# Patient Record
Sex: MALE | Race: WHITE | Employment: UNEMPLOYED | URBAN - METROPOLITAN AREA
[De-identification: names, ages, dates, MRNs, and addresses within clinical notes are randomized per-mention and may not be internally consistent; named-entity substitution may affect disease eponyms.]

---

## 2022-09-03 ENCOUNTER — HOSPITAL ENCOUNTER (EMERGENCY)
Age: 57
Discharge: HOME | End: 2022-09-03
Attending: EMERGENCY MEDICINE
Payer: OTHER GOVERNMENT

## 2022-09-03 VITALS
DIASTOLIC BLOOD PRESSURE: 81 MMHG | SYSTOLIC BLOOD PRESSURE: 131 MMHG | OXYGEN SATURATION: 94 % | RESPIRATION RATE: 15 BRPM | HEART RATE: 97 BPM | TEMPERATURE: 98.4 F

## 2022-09-03 DIAGNOSIS — T40.411A ACCIDENTAL FENTANYL OVERDOSE, INITIAL ENCOUNTER (HCC): Primary | ICD-10-CM

## 2022-09-03 LAB
GLUCOSE BLD-MCNC: 105 MG/DL (ref 75–110)
GLUCOSE BLD-MCNC: 95 MG/DL (ref 75–110)

## 2022-09-03 PROCEDURE — 6360000002 HC RX W HCPCS: Performed by: STUDENT IN AN ORGANIZED HEALTH CARE EDUCATION/TRAINING PROGRAM

## 2022-09-03 PROCEDURE — 96374 THER/PROPH/DIAG INJ IV PUSH: CPT

## 2022-09-03 PROCEDURE — 82947 ASSAY GLUCOSE BLOOD QUANT: CPT

## 2022-09-03 PROCEDURE — 2580000003 HC RX 258: Performed by: STUDENT IN AN ORGANIZED HEALTH CARE EDUCATION/TRAINING PROGRAM

## 2022-09-03 PROCEDURE — 93005 ELECTROCARDIOGRAM TRACING: CPT

## 2022-09-03 PROCEDURE — 99284 EMERGENCY DEPT VISIT MOD MDM: CPT

## 2022-09-03 RX ORDER — 0.9 % SODIUM CHLORIDE 0.9 %
1000 INTRAVENOUS SOLUTION INTRAVENOUS ONCE
Status: COMPLETED | OUTPATIENT
Start: 2022-09-03 | End: 2022-09-03

## 2022-09-03 RX ORDER — ONDANSETRON 2 MG/ML
4 INJECTION INTRAMUSCULAR; INTRAVENOUS ONCE
Status: COMPLETED | OUTPATIENT
Start: 2022-09-03 | End: 2022-09-03

## 2022-09-03 RX ADMIN — SODIUM CHLORIDE 1000 ML: 9 INJECTION, SOLUTION INTRAVENOUS at 04:38

## 2022-09-03 RX ADMIN — ONDANSETRON 4 MG: 2 INJECTION INTRAMUSCULAR; INTRAVENOUS at 04:37

## 2022-09-03 ASSESSMENT — ENCOUNTER SYMPTOMS
ABDOMINAL PAIN: 0
BACK PAIN: 0
VOMITING: 0
COUGH: 0
SHORTNESS OF BREATH: 0
SORE THROAT: 0

## 2022-09-03 NOTE — ED NOTES
Report taken from Lakisha Trejo, [facility]. Pt respirations are even and unlabored, bed is in the lowest position, call light is within reach. Will continue to monitor.        Milton Donato RN  09/03/22 1585

## 2022-09-03 NOTE — ED NOTES
Pt respirations are even and unlabored, pt is oriented X 4, speaking in complete sentences, bed is in the lowest position, call light is within reach. Will continue to monitor.        Clint Iraheta RN  09/03/22 7498

## 2022-09-03 NOTE — DISCHARGE INSTRUCTIONS
Discussed healthy drinking guidelines. Discussed patient's risk factors for alcohol misuse. Encouraged patient to set goal to moderate alcohol use. PLEASE RETURN TO THE EMERGENCY DEPARTMENT IMMEDIATELY for worsening symptoms, or if you develop any concerning symptoms such as: high fever not relieved by acetaminophen (Tylenol) and/or ibuprofen (Motrin), chills, shortness of breath, chest pain, persistent nausea and/or vomiting, vomiting any blood, blood in your stool, numbness, weakness or tingling in the arms or legs or change in color of the extremities, changes in mental status, persistent headache, blurry vision. Home

## 2022-09-03 NOTE — ED PROVIDER NOTES
[facility]  ED  Emergency Department Encounter  Emergency Medicine Resident     Pt Name:Jw Burns  MRN: [id number]  [place] 1965  Date of evaluation: 9/3/22  PCP:  No primary care provider on file. CHIEF COMPLAINT       Chief Complaint   Patient presents with    Drug Overdose       HISTORY OF PRESENT ILLNESS  (Location/Symptom, Timing/Onset, Context/Setting, Quality, Duration, Modifying Factors, Severity.)      Irlanda Jackson is a 61 y.o. male who presents with overdose on fentanyl. Patient admitted to snorting fentanyl earlier today, was found down, on  presents patient was unresponsive and required Narcan. Patient received a total of 8 mg of Narcan prior to arrival with regained consciousness. Patient is nauseous and shivering. Patient received 4 mg of Zofran prior to arrival.  Patient is alert and oriented at this time. Denies alcohol use, however per EMS there was questionable alcohol use on scene. Denies chest pain, shortness of breath, abdominal pain at this time. PAST MEDICAL / SURGICAL / SOCIAL / FAMILY HISTORY      has a past medical history of PTSD (post-traumatic stress disorder). has no past surgical history on file. Social History     Socioeconomic History    Marital status:      Spouse name: Not on file    Number of children: Not on file    Years of education: Not on file    Highest education level: Not on file   Occupational History    Not on file   Tobacco Use    Smoking status: Every Day     Packs/day: 0.50     Years: 30.00     Pack years: 15.00     Types: Cigarettes    Smokeless tobacco: Not on file   Substance and Sexual Activity    Alcohol use: Yes     Alcohol/week: 2.0 standard drinks     Types: 2 Cans of beer per week    Drug use: Yes     Types:  Other-see comments     Comment: Fentanyl    Sexual activity: Not on file   Other Topics Concern    Not on file   Social History Narrative    Not on file     Social Determinants of Health     Financial Resource Strain: Not on file   Food Insecurity: Not on file   Transportation Needs: Not on file   Physical Activity: Not on file   Stress: Not on file   Social Connections: Not on file   Intimate Partner Violence: Not on file   Housing Stability: Not on file       History reviewed. No pertinent family history. Allergies:  Patient has no known allergies. Home Medications:  Prior to Admission medications    Not on File       REVIEW OF SYSTEMS    (2-9 systems for level 4, 10 or more for level 5)      Review of Systems   Constitutional:  Negative for chills and fever. HENT:  Negative for sore throat. Eyes:  Negative for visual disturbance. Respiratory:  Negative for cough and shortness of breath. Cardiovascular:  Negative for chest pain and palpitations. Gastrointestinal:  Negative for abdominal pain and vomiting. Endocrine: Negative for polyuria. Genitourinary:  Negative for dysuria and hematuria. Musculoskeletal:  Negative for back pain. Skin:  Negative for rash. Neurological:  Negative for light-headedness and headaches. Psychiatric/Behavioral:  Negative for confusion. PHYSICAL EXAM   (up to 7 for level 4, 8 or more for level 5)      INITIAL VITALS:   /81   Pulse (!) 111   Temp 98.4 °F (36.9 °C)   Resp 15   SpO2 95%     Physical Exam  Constitutional:       Appearance: Normal appearance. Comments: Shivering   HENT:      Head: Normocephalic. Nose: Nose normal.      Mouth/Throat:      Mouth: Mucous membranes are moist.      Pharynx: Oropharynx is clear. Eyes:      Extraocular Movements: Extraocular movements intact. Pupils: Pupils are equal, round, and reactive to light. Cardiovascular:      Rate and Rhythm: Normal rate and regular rhythm. Pulses: Normal pulses. Heart sounds: Normal heart sounds. Pulmonary:      Effort: Pulmonary effort is normal.      Breath sounds: Normal breath sounds.    Abdominal:      Palpations: Abdomen is soft. Tenderness: There is no abdominal tenderness. There is no right CVA tenderness or left CVA tenderness. Musculoskeletal:      Right lower leg: No edema. Left lower leg: No edema. Skin:     General: Skin is warm. Capillary Refill: Capillary refill takes less than 2 seconds. Neurological:      General: No focal deficit present. Mental Status: He is alert and oriented to person, place, and time. DIFFERENTIAL  DIAGNOSIS     PLAN (LABS / IMAGING / EKG):  Orders Placed This Encounter   Procedures    POC Glucose Fingerstick    POC Glucose Fingerstick         MEDICATIONS ORDERED:  Orders Placed This Encounter   Medications    ondansetron (ZOFRAN) injection 4 mg    0.9 % sodium chloride bolus     DDX: Fentanyl overdose, other opioid overdose, EtOH intoxication, other drug overdose    DIAGNOSTIC RESULTS / EMERGENCY DEPARTMENT COURSE / MDM   LAB RESULTS:  Results for orders placed or performed during the hospital encounter of 09/03/22   POC Glucose Fingerstick   Result Value Ref Range    POC Glucose 95 75 - 110 mg/dL   POC Glucose Fingerstick   Result Value Ref Range    POC Glucose 105 75 - 110 mg/dL       IMPRESSION: 58-year-old gentleman presents to the emergency department after overdosing on fentanyl. Patient required 8 mg of Narcan total prior to arrival and regained consciousness. On arrival, patient is shivering and nauseous. Vital signs grossly stable, patient slightly tachycardic. Physical exam shows that patient is alert and oriented, is shivering and uncomfortable appearing. Abdomen is soft and nontender. Fluids, Zofran given. Patient is able to awaken now to voice, sleeping comfortably on stretcher. Notified by nursing staff that patient is sweaty, patient continues to be slightly tachycardic. Plan to let patient sleep and observe, reevaluation at a later time. Patient signed out to Dr. Todd Diehl for further care and evaluation.      RADIOLOGY:  No orders to display     EMERGENCY DEPARTMENT COURSE:  ED Course as of 09/03/22 0708   Sat Sep 03, 2022   0619 Patient resting comfortably on stretcher, responding to voice at this time. Patient is sweaty and tachycardic however comfortable. Plan to reevaluation after longer duration of observation [EM]      ED Course User Index  [EM] Saray Quach MD       No notes of EC Admission Criteria type on file. PROCEDURES:  None    CONSULTS:  None    FINAL IMPRESSION      1.  Accidental fentanyl overdose, initial encounter [facility])          DISPOSITION / PLAN     DISPOSITION        PATIENT REFERRED TO:  [address] [place] [id number] [phone]  Schedule an appointment as soon as possible for a visit   For follow up    [facility]  [address]  Go to   As needed    DISCHARGE MEDICATIONS:  New Prescriptions    No medications on file       Saray Quach MD  Emergency Medicine Resident    (Please note that portions of thisnote were completed with a voice recognition program.  Efforts were made to edit the dictations but occasionally words are mis-transcribed.)       Saray Quach MD  Resident  09/03/22 1805

## 2022-09-03 NOTE — ED NOTES
Pt profusely sweating. Pt denies being warm, in fact states he is cold. FSBS obtained which was 106 and temp 98.6. Dr Chika De La Rosa at bedside.       Kim Sosa, PARRIS  74/61/57 [id number]

## 2022-09-03 NOTE — ED PROVIDER NOTES
FACULTY SIGN-OUT  ADDENDUM     Care of this patient was assumed from previous attending physician. The patient's initial evaluation and plan have been discussed with the prior provider who initially evaluated the patient. Attestation  I was available and discussed any additional care issues that arose and coordinated the management plans with the resident(s) caring for the patient during my duty period. Any areas of disagreement with resident's documentation of care or procedures are noted on the chart. I was personally present for the key portions of any/all procedures, during my duty period. I have documented in the chart those procedures where I was not present during the key portions. ED COURSE      The patient was given the following medications:  Orders Placed This Encounter   Medications    ondansetron (ZOFRAN) injection 4 mg    0.9 % sodium chloride bolus       RECENT VITALS:   Temp: 98.4 °F (36.9 °C), Heart Rate: (!) 111, Resp: 15, BP: 128/81    MEDICAL DECISION MAKING        Daxa Vazquez is a 61 y.o. male who presents to the Emergency Department with complaints of fentanyl OD. Reassess when more awake.       Yajaira Buchanan MD  Attending Emergency Physician    (Please note that portions of this note were completed with a voice recognition program.  Efforts were made to edit the dictations but occasionally words are mis-transcribed.)          Yajaira Buchanan MD  09/03/22 6741

## 2022-09-03 NOTE — ED TRIAGE NOTES
Pt arrived via [facility] for Fentanyl overdose which pt stated he snorted . Pt was found by bystanders and agonal breathing on arrival. Pt was given 2mg of IN Narcan and 2mg IV Narcan. Pt ambulated to stretcher. Pt A&O x 3, does not appear in acute distress, resting comfortably on stretcher with eyes open and call light in reach. Vital signs obtained, medical hx and allergies reviewed with pt. Initial assessment performed by physician, [employer] will carry out initial orders/tasks and reassess pt.

## 2022-09-03 NOTE — ED NOTES
Pt resting with eyes closed. Pt showing no s/s of distress. Respirations even and unlabored.       Lincoln Walton LPN  79/43/93 7483

## 2022-09-03 NOTE — ED PROVIDER NOTES
[facility]   Emergency Department  Faculty Attestation       I performed a history and physical examination of the patient and discussed management with the resident. I reviewed the residents note and agree with the documented findings including all diagnostic interpretations and plan of care. Any areas of disagreement are noted on the chart. I was personally present for the key portions of any procedures. I have documented in the chart those procedures where I was not present during the key portions. I have reviewed the emergency nurses triage note. I agree with the chief complaint, past medical history, past surgical history, allergies, medications, social and family history as documented unless otherwise noted below. Documentation of the HPI, Physical Exam and Medical Decision Making performed by scribmerlyn is based on my personal performance of the HPI, PE and MDM. For Physician Assistant/ Nurse Practitioner cases/documentation I have personally evaluated this patient and have completed at least one if not all key elements of the E/M (history, physical exam, and MDM). Additional findings are as noted. Pertinent Comments     Primary Care Physician: No primary care provider on file. ED Triage Vitals   BP Temp Temp src Heart Rate Resp SpO2 Height Weight   09/03/22 0432 09/03/22 0429 -- 09/03/22 0429 09/03/22 0429 09/03/22 0429 -- --   (!) 173/97 98.4 °F (36.9 °C)  (!) 114 25 100 %            This is a 61 y.o. male who presents to the Emergency Department for overdose. Received Narcan. Does report that he did have fentanyl. No complaints on my exam, but was having shivers for initial resident evaluation. On exam sleeping comfortably awakens to voice. No significant distress. Slightly tachycardic in the low 110s. Heart sounds regular lungs auscultation. Abdomen soft nontender nondistended. Oriented x4 no focal deficits.     Monitor for redosing of narcan      EKG Interpretation    Interpreted by emergency department physician    Clinical Impression: Sinus tachycardia with incomplete right bundle branch block    Cassie Zhang MD        Critical Care: None     Cassie Zhang MD  Attending Emergency Physician         Cassie Zhang MD  09/03/22 6677

## 2022-09-03 NOTE — ED PROVIDER NOTES
As needed     DISCHARGE MEDICATIONS: There are no discharge medications for this patient.          Mayra Lemus MD  Emergency Medicine Resident  [address] [facility]        Mayra Lemus MD  Resident  09/03/22 7138

## 2022-09-03 NOTE — ED NOTES
Writer arranged patient transport home via [employer]. Cab Voucher used due to lack of transportation services with patient insurance.       LISA Castillo  09/03/22 6144

## 2022-09-06 LAB
EKG ATRIAL RATE: 115 BPM
EKG P AXIS: 71 DEGREES
EKG P-R INTERVAL: 170 MS
EKG Q-T INTERVAL: 312 MS
EKG QRS DURATION: 92 MS
EKG QTC CALCULATION (BAZETT): 431 MS
EKG R AXIS: 78 DEGREES
EKG T AXIS: 66 DEGREES
EKG VENTRICULAR RATE: 115 BPM